# Patient Record
Sex: MALE | Race: WHITE | HISPANIC OR LATINO | URBAN - METROPOLITAN AREA
[De-identification: names, ages, dates, MRNs, and addresses within clinical notes are randomized per-mention and may not be internally consistent; named-entity substitution may affect disease eponyms.]

---

## 2018-10-03 ENCOUNTER — HOSPITAL ENCOUNTER (INPATIENT)
Facility: MEDICAL CENTER | Age: 53
LOS: 2 days | DRG: 419 | End: 2018-10-05
Attending: EMERGENCY MEDICINE | Admitting: HOSPITALIST
Payer: COMMERCIAL

## 2018-10-03 ENCOUNTER — APPOINTMENT (OUTPATIENT)
Dept: RADIOLOGY | Facility: MEDICAL CENTER | Age: 53
DRG: 419 | End: 2018-10-03
Attending: HOSPITALIST
Payer: COMMERCIAL

## 2018-10-03 ENCOUNTER — HOSPITAL ENCOUNTER (OUTPATIENT)
Dept: RADIOLOGY | Facility: MEDICAL CENTER | Age: 53
End: 2018-10-03

## 2018-10-03 DIAGNOSIS — K80.50 CHOLEDOCHOLITHIASIS: ICD-10-CM

## 2018-10-03 PROBLEM — K80.20 CHOLELITHIASIS: Status: ACTIVE | Noted: 2018-10-03

## 2018-10-03 PROBLEM — E66.3 OVERWEIGHT: Status: ACTIVE | Noted: 2018-10-03

## 2018-10-03 LAB
ALBUMIN SERPL BCP-MCNC: 3.8 G/DL (ref 3.2–4.9)
ALBUMIN/GLOB SERPL: 1.4 G/DL
ALP SERPL-CCNC: 76 U/L (ref 30–99)
ALT SERPL-CCNC: 214 U/L (ref 2–50)
ANION GAP SERPL CALC-SCNC: 6 MMOL/L (ref 0–11.9)
AST SERPL-CCNC: 263 U/L (ref 12–45)
BASOPHILS # BLD AUTO: 0.3 % (ref 0–1.8)
BASOPHILS # BLD: 0.02 K/UL (ref 0–0.12)
BILIRUB CONJ SERPL-MCNC: 1.7 MG/DL (ref 0.1–0.5)
BILIRUB SERPL-MCNC: 2.9 MG/DL (ref 0.1–1.5)
BUN SERPL-MCNC: 7 MG/DL (ref 8–22)
CALCIUM SERPL-MCNC: 8.9 MG/DL (ref 8.5–10.5)
CHLORIDE SERPL-SCNC: 106 MMOL/L (ref 96–112)
CO2 SERPL-SCNC: 25 MMOL/L (ref 20–33)
CREAT SERPL-MCNC: 0.76 MG/DL (ref 0.5–1.4)
EOSINOPHIL # BLD AUTO: 0.03 K/UL (ref 0–0.51)
EOSINOPHIL NFR BLD: 0.4 % (ref 0–6.9)
ERYTHROCYTE [DISTWIDTH] IN BLOOD BY AUTOMATED COUNT: 41.1 FL (ref 35.9–50)
EST. AVERAGE GLUCOSE BLD GHB EST-MCNC: 128 MG/DL
GLOBULIN SER CALC-MCNC: 2.8 G/DL (ref 1.9–3.5)
GLUCOSE SERPL-MCNC: 121 MG/DL (ref 65–99)
HBA1C MFR BLD: 6.1 % (ref 0–5.6)
HCT VFR BLD AUTO: 40.3 % (ref 42–52)
HGB BLD-MCNC: 14.1 G/DL (ref 14–18)
IMM GRANULOCYTES # BLD AUTO: 0.02 K/UL (ref 0–0.11)
IMM GRANULOCYTES NFR BLD AUTO: 0.3 % (ref 0–0.9)
LIPASE SERPL-CCNC: 65 U/L (ref 11–82)
LYMPHOCYTES # BLD AUTO: 0.83 K/UL (ref 1–4.8)
LYMPHOCYTES NFR BLD: 11.4 % (ref 22–41)
MCH RBC QN AUTO: 31.9 PG (ref 27–33)
MCHC RBC AUTO-ENTMCNC: 35 G/DL (ref 33.7–35.3)
MCV RBC AUTO: 91.2 FL (ref 81.4–97.8)
MONOCYTES # BLD AUTO: 0.55 K/UL (ref 0–0.85)
MONOCYTES NFR BLD AUTO: 7.5 % (ref 0–13.4)
NEUTROPHILS # BLD AUTO: 5.86 K/UL (ref 1.82–7.42)
NEUTROPHILS NFR BLD: 80.1 % (ref 44–72)
NRBC # BLD AUTO: 0 K/UL
NRBC BLD-RTO: 0 /100 WBC
PLATELET # BLD AUTO: 166 K/UL (ref 164–446)
PMV BLD AUTO: 11.1 FL (ref 9–12.9)
POTASSIUM SERPL-SCNC: 3.7 MMOL/L (ref 3.6–5.5)
PROT SERPL-MCNC: 6.6 G/DL (ref 6–8.2)
RBC # BLD AUTO: 4.42 M/UL (ref 4.7–6.1)
SODIUM SERPL-SCNC: 137 MMOL/L (ref 135–145)
TROPONIN I SERPL-MCNC: <0.01 NG/ML (ref 0–0.04)
WBC # BLD AUTO: 7.3 K/UL (ref 4.8–10.8)

## 2018-10-03 PROCEDURE — 700101 HCHG RX REV CODE 250: Performed by: INTERNAL MEDICINE

## 2018-10-03 PROCEDURE — 502571 HCHG PACK, LAP CHOLE: Performed by: SURGERY

## 2018-10-03 PROCEDURE — 700102 HCHG RX REV CODE 250 W/ 637 OVERRIDE(OP): Performed by: HOSPITALIST

## 2018-10-03 PROCEDURE — A6402 STERILE GAUZE <= 16 SQ IN: HCPCS | Performed by: SURGERY

## 2018-10-03 PROCEDURE — A9270 NON-COVERED ITEM OR SERVICE: HCPCS | Performed by: HOSPITALIST

## 2018-10-03 PROCEDURE — 93005 ELECTROCARDIOGRAM TRACING: CPT | Performed by: EMERGENCY MEDICINE

## 2018-10-03 PROCEDURE — 99223 1ST HOSP IP/OBS HIGH 75: CPT | Performed by: HOSPITALIST

## 2018-10-03 PROCEDURE — 770006 HCHG ROOM/CARE - MED/SURG/GYN SEMI*

## 2018-10-03 PROCEDURE — 36415 COLL VENOUS BLD VENIPUNCTURE: CPT

## 2018-10-03 PROCEDURE — 700111 HCHG RX REV CODE 636 W/ 250 OVERRIDE (IP): Performed by: INTERNAL MEDICINE

## 2018-10-03 PROCEDURE — 82248 BILIRUBIN DIRECT: CPT

## 2018-10-03 PROCEDURE — 501838 HCHG SUTURE GENERAL: Performed by: SURGERY

## 2018-10-03 PROCEDURE — 160002 HCHG RECOVERY MINUTES (STAT): Performed by: SURGERY

## 2018-10-03 PROCEDURE — 160039 HCHG SURGERY MINUTES - EA ADDL 1 MIN LEVEL 3: Performed by: SURGERY

## 2018-10-03 PROCEDURE — 700105 HCHG RX REV CODE 258: Performed by: INTERNAL MEDICINE

## 2018-10-03 PROCEDURE — 88304 TISSUE EXAM BY PATHOLOGIST: CPT

## 2018-10-03 PROCEDURE — 501584 HCHG TROCAR, THRD CAN&SEAL11X100: Performed by: SURGERY

## 2018-10-03 PROCEDURE — 83036 HEMOGLOBIN GLYCOSYLATED A1C: CPT

## 2018-10-03 PROCEDURE — 501583 HCHG TROCAR, THRD CAN&SEAL 5X100: Performed by: SURGERY

## 2018-10-03 PROCEDURE — 84484 ASSAY OF TROPONIN QUANT: CPT

## 2018-10-03 PROCEDURE — 110454 HCHG SHELL REV 250: Performed by: SURGERY

## 2018-10-03 PROCEDURE — 160028 HCHG SURGERY MINUTES - 1ST 30 MINS LEVEL 3: Performed by: SURGERY

## 2018-10-03 PROCEDURE — 160036 HCHG PACU - EA ADDL 30 MINS PHASE I: Performed by: SURGERY

## 2018-10-03 PROCEDURE — 83690 ASSAY OF LIPASE: CPT

## 2018-10-03 PROCEDURE — 501577 HCHG TROCAR, STEP 11MM: Performed by: SURGERY

## 2018-10-03 PROCEDURE — A9270 NON-COVERED ITEM OR SERVICE: HCPCS | Performed by: SURGERY

## 2018-10-03 PROCEDURE — 501570 HCHG TROCAR, SEPARATOR: Performed by: SURGERY

## 2018-10-03 PROCEDURE — 74181 MRI ABDOMEN W/O CONTRAST: CPT

## 2018-10-03 PROCEDURE — 99285 EMERGENCY DEPT VISIT HI MDM: CPT

## 2018-10-03 PROCEDURE — 700111 HCHG RX REV CODE 636 W/ 250 OVERRIDE (IP)

## 2018-10-03 PROCEDURE — 501338 HCHG SHEARS, ENDO: Performed by: SURGERY

## 2018-10-03 PROCEDURE — 82105 ALPHA-FETOPROTEIN SERUM: CPT

## 2018-10-03 PROCEDURE — 160048 HCHG OR STATISTICAL LEVEL 1-5: Performed by: SURGERY

## 2018-10-03 PROCEDURE — 700102 HCHG RX REV CODE 250 W/ 637 OVERRIDE(OP): Performed by: ANESTHESIOLOGY

## 2018-10-03 PROCEDURE — 85025 COMPLETE CBC W/AUTO DIFF WBC: CPT

## 2018-10-03 PROCEDURE — 501399 HCHG SPECIMAN BAG, ENDO CATC: Performed by: SURGERY

## 2018-10-03 PROCEDURE — 80053 COMPREHEN METABOLIC PANEL: CPT

## 2018-10-03 PROCEDURE — 502648 HCHG APPLICATOR, EVICEL: Performed by: SURGERY

## 2018-10-03 PROCEDURE — 700101 HCHG RX REV CODE 250

## 2018-10-03 PROCEDURE — 700102 HCHG RX REV CODE 250 W/ 637 OVERRIDE(OP): Performed by: SURGERY

## 2018-10-03 PROCEDURE — 160035 HCHG PACU - 1ST 60 MINS PHASE I: Performed by: SURGERY

## 2018-10-03 PROCEDURE — 160009 HCHG ANES TIME/MIN: Performed by: SURGERY

## 2018-10-03 PROCEDURE — A9270 NON-COVERED ITEM OR SERVICE: HCPCS | Performed by: ANESTHESIOLOGY

## 2018-10-03 PROCEDURE — 0FT44ZZ RESECTION OF GALLBLADDER, PERCUTANEOUS ENDOSCOPIC APPROACH: ICD-10-PCS | Performed by: SURGERY

## 2018-10-03 PROCEDURE — 700111 HCHG RX REV CODE 636 W/ 250 OVERRIDE (IP): Performed by: ANESTHESIOLOGY

## 2018-10-03 PROCEDURE — 500868 HCHG NEEDLE, SURGI(VARES): Performed by: SURGERY

## 2018-10-03 RX ORDER — HYDROMORPHONE HYDROCHLORIDE 2 MG/ML
0.1 INJECTION, SOLUTION INTRAMUSCULAR; INTRAVENOUS; SUBCUTANEOUS
Status: DISCONTINUED | OUTPATIENT
Start: 2018-10-03 | End: 2018-10-03 | Stop reason: HOSPADM

## 2018-10-03 RX ORDER — OXYCODONE HYDROCHLORIDE 5 MG/1
5 TABLET ORAL EVERY 4 HOURS PRN
Status: DISCONTINUED | OUTPATIENT
Start: 2018-10-03 | End: 2018-10-05 | Stop reason: HOSPADM

## 2018-10-03 RX ORDER — OXYCODONE HYDROCHLORIDE 5 MG/1
5 TABLET ORAL
Status: DISCONTINUED | OUTPATIENT
Start: 2018-10-03 | End: 2018-10-03 | Stop reason: HOSPADM

## 2018-10-03 RX ORDER — HYDROMORPHONE HYDROCHLORIDE 2 MG/ML
0.2 INJECTION, SOLUTION INTRAMUSCULAR; INTRAVENOUS; SUBCUTANEOUS
Status: DISCONTINUED | OUTPATIENT
Start: 2018-10-03 | End: 2018-10-03 | Stop reason: HOSPADM

## 2018-10-03 RX ORDER — ONDANSETRON 2 MG/ML
4 INJECTION INTRAMUSCULAR; INTRAVENOUS EVERY 4 HOURS PRN
Status: DISCONTINUED | OUTPATIENT
Start: 2018-10-03 | End: 2018-10-05 | Stop reason: HOSPADM

## 2018-10-03 RX ORDER — CLONIDINE HYDROCHLORIDE 0.1 MG/1
0.1 TABLET ORAL EVERY 6 HOURS PRN
Status: DISCONTINUED | OUTPATIENT
Start: 2018-10-03 | End: 2018-10-05 | Stop reason: HOSPADM

## 2018-10-03 RX ORDER — MEPERIDINE HYDROCHLORIDE 25 MG/ML
12.5 INJECTION INTRAMUSCULAR; INTRAVENOUS; SUBCUTANEOUS
Status: DISCONTINUED | OUTPATIENT
Start: 2018-10-03 | End: 2018-10-03 | Stop reason: HOSPADM

## 2018-10-03 RX ORDER — ONDANSETRON 4 MG/1
4 TABLET, ORALLY DISINTEGRATING ORAL EVERY 4 HOURS PRN
Status: DISCONTINUED | OUTPATIENT
Start: 2018-10-03 | End: 2018-10-05 | Stop reason: HOSPADM

## 2018-10-03 RX ORDER — AMOXICILLIN 250 MG
2 CAPSULE ORAL 2 TIMES DAILY
Status: DISCONTINUED | OUTPATIENT
Start: 2018-10-03 | End: 2018-10-05 | Stop reason: HOSPADM

## 2018-10-03 RX ORDER — BISACODYL 10 MG
10 SUPPOSITORY, RECTAL RECTAL
Status: DISCONTINUED | OUTPATIENT
Start: 2018-10-03 | End: 2018-10-05 | Stop reason: HOSPADM

## 2018-10-03 RX ORDER — POLYETHYLENE GLYCOL 3350 17 G/17G
1 POWDER, FOR SOLUTION ORAL
Status: DISCONTINUED | OUTPATIENT
Start: 2018-10-03 | End: 2018-10-05 | Stop reason: HOSPADM

## 2018-10-03 RX ORDER — SODIUM CHLORIDE, SODIUM LACTATE, POTASSIUM CHLORIDE, CALCIUM CHLORIDE 600; 310; 30; 20 MG/100ML; MG/100ML; MG/100ML; MG/100ML
INJECTION, SOLUTION INTRAVENOUS
Status: COMPLETED | OUTPATIENT
Start: 2018-10-03 | End: 2018-10-03

## 2018-10-03 RX ORDER — ACETAMINOPHEN 325 MG/1
650 TABLET ORAL EVERY 6 HOURS PRN
Status: DISCONTINUED | OUTPATIENT
Start: 2018-10-03 | End: 2018-10-05 | Stop reason: HOSPADM

## 2018-10-03 RX ORDER — PROMETHAZINE HYDROCHLORIDE 25 MG/1
12.5-25 TABLET ORAL EVERY 4 HOURS PRN
Status: DISCONTINUED | OUTPATIENT
Start: 2018-10-03 | End: 2018-10-05 | Stop reason: HOSPADM

## 2018-10-03 RX ORDER — HALOPERIDOL 5 MG/ML
1 INJECTION INTRAMUSCULAR
Status: DISCONTINUED | OUTPATIENT
Start: 2018-10-03 | End: 2018-10-03 | Stop reason: HOSPADM

## 2018-10-03 RX ORDER — MAGNESIUM HYDROXIDE 1200 MG/15ML
LIQUID ORAL
Status: COMPLETED | OUTPATIENT
Start: 2018-10-03 | End: 2018-10-03

## 2018-10-03 RX ORDER — MORPHINE SULFATE 4 MG/ML
2 INJECTION, SOLUTION INTRAMUSCULAR; INTRAVENOUS EVERY 4 HOURS PRN
Status: DISCONTINUED | OUTPATIENT
Start: 2018-10-03 | End: 2018-10-05 | Stop reason: HOSPADM

## 2018-10-03 RX ORDER — PROMETHAZINE HYDROCHLORIDE 25 MG/1
12.5-25 SUPPOSITORY RECTAL EVERY 4 HOURS PRN
Status: DISCONTINUED | OUTPATIENT
Start: 2018-10-03 | End: 2018-10-05 | Stop reason: HOSPADM

## 2018-10-03 RX ORDER — OXYCODONE HYDROCHLORIDE 10 MG/1
10 TABLET ORAL EVERY 4 HOURS PRN
Status: DISCONTINUED | OUTPATIENT
Start: 2018-10-03 | End: 2018-10-05 | Stop reason: HOSPADM

## 2018-10-03 RX ORDER — BUPIVACAINE HYDROCHLORIDE AND EPINEPHRINE 2.5; 5 MG/ML; UG/ML
INJECTION, SOLUTION EPIDURAL; INFILTRATION; INTRACAUDAL; PERINEURAL
Status: DISCONTINUED | OUTPATIENT
Start: 2018-10-03 | End: 2018-10-03 | Stop reason: HOSPADM

## 2018-10-03 RX ORDER — OXYCODONE HYDROCHLORIDE 5 MG/1
10 TABLET ORAL
Status: DISCONTINUED | OUTPATIENT
Start: 2018-10-03 | End: 2018-10-03 | Stop reason: HOSPADM

## 2018-10-03 RX ORDER — SODIUM CHLORIDE 9 MG/ML
INJECTION, SOLUTION INTRAVENOUS CONTINUOUS
Status: DISCONTINUED | OUTPATIENT
Start: 2018-10-03 | End: 2018-10-04

## 2018-10-03 RX ORDER — HYDROMORPHONE HYDROCHLORIDE 2 MG/ML
0.4 INJECTION, SOLUTION INTRAMUSCULAR; INTRAVENOUS; SUBCUTANEOUS
Status: DISCONTINUED | OUTPATIENT
Start: 2018-10-03 | End: 2018-10-03 | Stop reason: HOSPADM

## 2018-10-03 RX ORDER — OXYCODONE HCL 5 MG/5 ML
10 SOLUTION, ORAL ORAL
Status: COMPLETED | OUTPATIENT
Start: 2018-10-03 | End: 2018-10-03

## 2018-10-03 RX ORDER — OXYCODONE HCL 5 MG/5 ML
5 SOLUTION, ORAL ORAL
Status: COMPLETED | OUTPATIENT
Start: 2018-10-03 | End: 2018-10-03

## 2018-10-03 RX ADMIN — FENTANYL CITRATE 50 MCG: 50 INJECTION, SOLUTION INTRAMUSCULAR; INTRAVENOUS at 17:03

## 2018-10-03 RX ADMIN — METRONIDAZOLE 500 MG: 500 INJECTION, SOLUTION INTRAVENOUS at 21:39

## 2018-10-03 RX ADMIN — ACETAMINOPHEN 650 MG: 325 TABLET, FILM COATED ORAL at 22:03

## 2018-10-03 RX ADMIN — METRONIDAZOLE 500 MG: 500 INJECTION, SOLUTION INTRAVENOUS at 13:44

## 2018-10-03 RX ADMIN — CEFTRIAXONE SODIUM 2 G: 2 INJECTION, POWDER, FOR SOLUTION INTRAMUSCULAR; INTRAVENOUS at 10:33

## 2018-10-03 RX ADMIN — METRONIDAZOLE 500 MG: 500 INJECTION, SOLUTION INTRAVENOUS at 09:04

## 2018-10-03 RX ADMIN — OXYCODONE HYDROCHLORIDE 10 MG: 5 SOLUTION ORAL at 17:02

## 2018-10-03 ASSESSMENT — COGNITIVE AND FUNCTIONAL STATUS - GENERAL
SUGGESTED CMS G CODE MODIFIER MOBILITY: CH
SUGGESTED CMS G CODE MODIFIER DAILY ACTIVITY: CH
DAILY ACTIVITIY SCORE: 24
SUGGESTED CMS G CODE MODIFIER DAILY ACTIVITY: CH
DAILY ACTIVITIY SCORE: 24
MOBILITY SCORE: 24

## 2018-10-03 ASSESSMENT — PAIN SCALES - GENERAL
PAINLEVEL_OUTOF10: 2
PAINLEVEL_OUTOF10: 4
PAINLEVEL_OUTOF10: 3
PAINLEVEL_OUTOF10: 0
PAINLEVEL_OUTOF10: 4
PAINLEVEL_OUTOF10: 5

## 2018-10-03 ASSESSMENT — PATIENT HEALTH QUESTIONNAIRE - PHQ9
SUM OF ALL RESPONSES TO PHQ9 QUESTIONS 1 AND 2: 0
1. LITTLE INTEREST OR PLEASURE IN DOING THINGS: NOT AT ALL
2. FEELING DOWN, DEPRESSED, IRRITABLE, OR HOPELESS: NOT AT ALL

## 2018-10-03 ASSESSMENT — ENCOUNTER SYMPTOMS
NEUROLOGICAL NEGATIVE: 1
ABDOMINAL PAIN: 1
MUSCULOSKELETAL NEGATIVE: 1
RESPIRATORY NEGATIVE: 1
CONSTITUTIONAL NEGATIVE: 1
PSYCHIATRIC NEGATIVE: 1
CARDIOVASCULAR NEGATIVE: 1
EYES NEGATIVE: 1
NAUSEA: 1

## 2018-10-03 ASSESSMENT — LIFESTYLE VARIABLES: ALCOHOL_USE: NO

## 2018-10-03 NOTE — ED PROVIDER NOTES
"ED Provider Note    Scribed for Maryann Napier M.D. by Gosia Villatoro. 10/3/2018  2:00 AM    Means of arrival: Ambulance  History obtained from: Patient  History limited by: None      CHIEF COMPLAINT  Chief Complaint   Patient presents with   • Epigastric Pain     Pt reporting constant epigastric pain that started yesterday approx noon.   • Nausea       HPI  Francis Chavarria is a 53 y.o. male with history of known gallstones who presents to the Emergency Department as a transfer from [facility] for evaluation of epigastric pain onset at 12 PM today. The patient endorses associated nausea, but denies vomiting.  Patient states his pain is controlled at this time.  He has no current complaints.  He was initially evaluated at [facility] today and found to have choledocolithiasis. The patient received 15 mg Toradol and 4 mg of Morphine at that time, which has now resolved his pain. The patient was transferred to [facility] for a GI consult and possible ERCP. He denies any recent dysuria, hematuria, or diarrhea. The patient reports no prior abdominal surgeries.     REVIEW OF SYSTEMS  Pertinent positive include epigastric pain and nausea. Pertinent negative include vomiting, dysuria, hematuria, or diarrhea. All other systems reviewed and are negative.      PAST MEDICAL HISTORY   Gallstones    SOCIAL HISTORY  Social History     Social History Main Topics   • Smoking status: None noted   • Smokeless tobacco: No   • Alcohol use None noted   • Drug use: None noted   • Sexual activity: None noted       SURGICAL HISTORY  No abdominal surgeries    CURRENT MEDICATIONS  Home Medications     Reviewed by Lakisha Frye R.N. (Registered Nurse) on 10/03/18 at 0213  Med List Status: Complete   Medication Last Dose Status        Patient Yakov Taking any Medications                       ALLERGIES  No Known Allergies    PHYSICAL EXAM   VITAL SIGNS: Ht 1.702 m (5' 7\")   Wt 83.8 kg (184 lb 11.9 oz)   BMI 28.94 kg/m²  "   Constitutional: Non-toxic appearing  male. Alert in no apparent distress.  HENT: Normocephalic, Atraumatic. Bilateral external ears normal. Nose normal.  Moist mucous membranes.  Oropharynx clear.  Eyes: Pupils are equal and reactive. Conjunctiva normal.   Neck: Supple, full range of motion  Heart: Regular rate and rhythm. Systolic murmur noted.    Lungs: No respiratory distress, normal work of breathing. Lungs clear to auscultation bilaterally.  Abdomen Soft, no distention. Mild epigastric tenderness without rebound or guarding  Musculoskeletal: Atraumatic. No obvious deformities noted.  No lower extremity edema.  Skin: Warm, Dry.  No erythema, No rash.   Neurologic: Alert and oriented x3. Moving all extremities spontaneously without focal deficits.  Psychiatric: Affect normal, Mood normal, Appears appropriate and not intoxicated.      DIAGNOSTIC STUDIES    EKG  EKG personally reviewed by myself in the absence of a cardiologist showed:  Sinus Bradycardia with rate of 57  Normal axis and intervals  No ectopy or hypertrophy  No ST or T wave change  Impression: Normal EKG    LABS  Personally reviewed by me  Labs Reviewed   CBC WITH DIFFERENTIAL - Abnormal; Notable for the following:        Result Value    RBC 4.42 (*)     Hematocrit 40.3 (*)     Neutrophils-Polys 80.10 (*)     Lymphocytes 11.40 (*)     Lymphs (Absolute) 0.83 (*)     All other components within normal limits   COMP METABOLIC PANEL - Abnormal; Notable for the following:     Glucose 121 (*)     Bun 7 (*)     AST(SGOT) 263 (*)     ALT(SGPT) 214 (*)     Total Bilirubin 2.9 (*)     All other components within normal limits   LIPASE   TROPONIN   ESTIMATED GFR   HEMOGLOBIN A1C         RADIOLOGY  Personally reviewed by me  OUTSIDE IMAGES-US ABDOMEN   Final Result      [id number]    (Results Pending)         ED COURSE  Vitals:    10/03/18 0230 10/03/18 0300 10/03/18 0330 10/03/18 0400   BP:       Pulse: (!) 56 71 (!) 59 66   Resp: 16 17 14 16    Temp:       SpO2: 98% 94% 95% 95%   Weight:       Height:             Medications administered:      Old records personally reviewed:  Obtained and reviewed past medical records from [facility] which indicate patient had WBC of 10.3, Bilirubin of 2.7, AST of 499, and ALT of 309. US showed cholelithiasis, mild gallbladder wall thickening, and 6 mm CBD.     2:00 AM Patient seen and examined at bedside. The patient presents with epigastric pain. Ordered for Troponin, Lipase, CMP, CBC with differential, and EKG to evaluate.     MEDICAL DECISION MAKING  Patient with known history of cholelithiasis who presents as a transfer from outside hospital with concern for choledocholithiasis.  Patient is afebrile with normal vitals on arrival and well-appearing.  His pain is currently controlled.  EKG without evidence of ischemia or arrhythmia, troponin negative, doubt ACS.  Abdominal exam is not concerning for bowel obstruction, perforation, appendicitis, diverticulitis.  Patient does have elevated bilirubin in addition to transaminitis consistent with likely obstructive pattern.  I reviewed his outside imaging demonstrating obvious stones and mild wall thickening with associated mild dilation of the common bile duct.  There are no overt signs of acute holecystitis and patient has no associated leukocytosis.  We will plan for admission for MRCP in the morning followed by likely gastroenterology and surgical consults.    3:31 AM - Consut with [facility], who agrees to admit the patient. He will have MRCP in the morning and GI consultation.  Patient is stable at this time for transfer to the floor.      DISPOSITION:  Patient will be admitted to [facility], in guarded condition.    IMPRESSION  (K80.50) Choledocholithiasis    Results, diagnoses, and treatment options were discussed with the patient and/or family. Patient verbalized understanding of plan of care.    New Prescriptions    No medications on  file            Gosia COKER (Kenia), am scribing for, and in the presence of, Maryann Napier M.D..    Electronically signed by: Gosia Villatoro (Kenia), 10/3/2018    Maryann COKER M.D. personally performed the services described in this documentation, as scribed by Gosia Villatoro in my presence, and it is both accurate and complete.    C.    The note accurately reflects work and decisions made by me.  Maryann Napier  10/3/2018  7:57 AM

## 2018-10-03 NOTE — ED TRIAGE NOTES
"Chief Complaint   Patient presents with   • Epigastric Pain     Pt reporting constant epigastric pain that started yesterday approx noon.   • Nausea     /82   Pulse (!) 53   Temp 36.6 °C (97.9 °F)   Resp 16   Ht 1.702 m (5' 7\")   Wt 83.8 kg (184 lb 11.9 oz)   SpO2 96%   BMI 28.94 kg/m²     Pt brought in by [facility], transfer from [facility], pt denies pain currently. AOx4. Placed in room RD 1. Complaints and vitals as above. Pt on monitors, all alarms audible. Chart flagged for ERP to see.  "

## 2018-10-03 NOTE — PROGRESS NOTES
Patient is examed and agreed with H&P assessment and plan.    Possible cholecystitis, start iv abx. Pending MRCP, possible surgery vs GI consult.

## 2018-10-03 NOTE — H&P
Hospital Medicine History & Physical Note    Date of Service  10/3/2018    Primary Care Physician  No primary care provider on file.    Consultants  None    Code Status  Full cod e    Chief Complaint  Abdominal  Pain     History of Presenting Illness  53 y.o. Male with known history of cholelithiasis, with prior episodes of symptomatic cholelithiasis, was in his usual state of health until the day prior to admission.  He reports that approximately 6 hours prior to his arrival at an outside facility, he developed abdominal pain.  This was in his epigastrium, seemed to radiate to his back and up his neck and back.  He noted no exacerbating or alleviating factors.  He apparently ate a normal meal prior to the pain.  He also attempted to eat to alleviate the pain which has worked in the past.  When the pain did not go away, he presented to an outside for further evaluation.  He was subsequently transferred to this facility for higher level of care.  He currently has no nausea or vomiting.  His abdominal pain has subsided with treatment.    Review of Systems  Review of Systems   Constitutional: Negative.    HENT: Negative.    Eyes: Negative.    Respiratory: Negative.    Cardiovascular: Negative.    Gastrointestinal: Positive for abdominal pain and nausea.   Genitourinary: Negative.    Musculoskeletal: Negative.    Skin: Negative.    Neurological: Negative.    Endo/Heme/Allergies: Negative.    Psychiatric/Behavioral: Negative.        Past Medical History  No reported past medical history    Surgical History   has a past surgical history that includes other orthopedic surgery.     Family History  family history includes No Known Problems in his father and mother.     Social History   reports that he has never smoked. He has never used smokeless tobacco. He reports that he does not drink alcohol or use drugs.    Allergies  No Known Allergies    Medications  None       Physical Exam  Temp:  [36.6 °C (97.9 °F)] 36.6 °C (97.9  °F)  Pulse:  [53-71] 71  Resp:  [16-17] 17  BP: (137)/(82) 137/82    Physical Exam   Constitutional: He is oriented to person, place, and time. He appears well-developed and well-nourished. No distress.   HENT:   Head: Normocephalic and atraumatic.   Eyes: Pupils are equal, round, and reactive to light. Conjunctivae are normal.   Neck: Normal range of motion. Neck supple. No tracheal deviation present. No thyromegaly present.   Cardiovascular: Normal rate, regular rhythm and normal heart sounds.  Exam reveals no gallop and no friction rub.    No murmur heard.  Pulmonary/Chest: Effort normal and breath sounds normal. No respiratory distress. He has no wheezes.   Abdominal: Soft. Bowel sounds are normal. He exhibits no distension and no mass. There is no tenderness. There is no rebound and no guarding.   Musculoskeletal: Normal range of motion. He exhibits no edema.   Lymphadenopathy:     He has no cervical adenopathy.   Neurological: He is alert and oriented to person, place, and time. No cranial nerve deficit.   Skin: Skin is warm and dry. He is not diaphoretic.   Psychiatric: He has a normal mood and affect.   Nursing note and vitals reviewed.      Laboratory:  Recent Labs      10/03/18   0202   WBC  7.3   RBC  4.42*   HEMOGLOBIN  14.1   HEMATOCRIT  40.3*   MCV  91.2   MCH  31.9   MCHC  35.0   RDW  41.1   PLATELETCT  166   MPV  11.1     Recent Labs      10/03/18   0202   SODIUM  137   POTASSIUM  3.7   CHLORIDE  106   CO2  25   GLUCOSE  121*   BUN  7*   CREATININE  0.76   CALCIUM  8.9     Recent Labs      10/03/18   0202   ALTSGPT  214*   ASTSGOT  263*   ALKPHOSPHAT  76   TBILIRUBIN  2.9*   LIPASE  65   GLUCOSE  121*                 Recent Labs      10/03/18   0202   TROPONINI  <0.01       Urinalysis:    No results found     Imaging:  OUTSIDE IMAGES-US ABDOMEN   Final Result      Abdominal ultrasound from outside facility shows cholelithiasis, mild gallbladder wall thickening, possible cholecystitis in the  appropriate clinical setting.  Common bile duct is measured at 6 mm      Assessment/Plan:  I anticipate this patient will require at least two midnights for appropriate medical management, necessitating inpatient admission.    * Choledocholithiasis   Assessment & Plan    With common bile duct dilatation, elevated aminotransferase levels, concern for obstruction.  Will obtain magnetic resonance cholangiopancreatography.        Cholelithiasis   Assessment & Plan    Known disease, with symptoms.  Apparently there was plans for outpatient surgery, this will need to still go forward.        Overweight   Assessment & Plan    Body mass index is 28.94 kg/m².              VTE prophylaxis: SCD

## 2018-10-03 NOTE — PROGRESS NOTES
General Surgery Note  Consulted by Dr. Briggs for possible cholecystitis with question of choledocholithiasis.   Patient currently in MRI.   I will await results and return later to see the patient.     Update 1430: MRI shows no clear evidence of choledocholithiasis at this time though he has a significant dilation of the common duct. No evidence of acute cholecystitis, though there is remaining gallstones.   Using a  I recommended he have a cholecystectomy this admission, he is agreeable. We discussed R/B/A as dictated. All questions answered. Will proceed this afternoon.   Dictation: [id number]

## 2018-10-03 NOTE — ASSESSMENT & PLAN NOTE
Known disease, with symptoms.  Apparently there was plans for outpatient surgery, this will need to still go forward.

## 2018-10-03 NOTE — ASSESSMENT & PLAN NOTE
With common bile duct dilatation, elevated aminotransferase levels, concern for obstruction.  Will obtain magnetic resonance cholangiopancreatography.

## 2018-10-03 NOTE — ED NOTES
KINDER FALL RISK      RISK  Present to ED b/c of fall (syncope, seizure, or ALOC) No  Age  >70 No  Altered Mental Status (Intoxicated with alcohol or substance confusion, inability to follow instructions, disorientation) No  Impaired Mobility (Ambulates or transfers with assistive devices or assist. Ambulates with unsteady gait and no assistance.  Unable to ambulate to transfer.) No  Nursing Judgement (Bowel or bladder incontinence, diarrhea, urinary frequency or urgency, leg weakness, orthostatic hypotension, dizziness or vertigo, narcotic use.) No

## 2018-10-03 NOTE — CARE PLAN
Problem: Knowledge Deficit  Goal: Knowledge of disease process/condition, treatment plan, diagnostic tests, and medications will improve    Intervention: Assess knowledge level of disease process/condition, treatment plan, diagnostic tests, and medications  Went over POC MRI today.

## 2018-10-04 PROBLEM — R74.01 TRANSAMINITIS: Status: ACTIVE | Noted: 2018-10-03

## 2018-10-04 PROBLEM — R16.0 LIVER MASS: Status: ACTIVE | Noted: 2018-10-04

## 2018-10-04 LAB
ALBUMIN SERPL BCP-MCNC: 3.8 G/DL (ref 3.2–4.9)
ALBUMIN/GLOB SERPL: 1.4 G/DL
ALP SERPL-CCNC: 100 U/L (ref 30–99)
ALT SERPL-CCNC: 180 U/L (ref 2–50)
ANION GAP SERPL CALC-SCNC: 8 MMOL/L (ref 0–11.9)
AST SERPL-CCNC: 121 U/L (ref 12–45)
BASOPHILS # BLD AUTO: 0.2 % (ref 0–1.8)
BASOPHILS # BLD: 0.01 K/UL (ref 0–0.12)
BILIRUB SERPL-MCNC: 2.4 MG/DL (ref 0.1–1.5)
BUN SERPL-MCNC: 9 MG/DL (ref 8–22)
CALCIUM SERPL-MCNC: 8.6 MG/DL (ref 8.5–10.5)
CHLORIDE SERPL-SCNC: 102 MMOL/L (ref 96–112)
CO2 SERPL-SCNC: 26 MMOL/L (ref 20–33)
CREAT SERPL-MCNC: 1.04 MG/DL (ref 0.5–1.4)
EKG IMPRESSION: NORMAL
EOSINOPHIL # BLD AUTO: 0 K/UL (ref 0–0.51)
EOSINOPHIL NFR BLD: 0 % (ref 0–6.9)
ERYTHROCYTE [DISTWIDTH] IN BLOOD BY AUTOMATED COUNT: 41.5 FL (ref 35.9–50)
GLOBULIN SER CALC-MCNC: 2.8 G/DL (ref 1.9–3.5)
GLUCOSE SERPL-MCNC: 147 MG/DL (ref 65–99)
HCT VFR BLD AUTO: 40.3 % (ref 42–52)
HGB BLD-MCNC: 14.2 G/DL (ref 14–18)
IMM GRANULOCYTES # BLD AUTO: 0.02 K/UL (ref 0–0.11)
IMM GRANULOCYTES NFR BLD AUTO: 0.4 % (ref 0–0.9)
LYMPHOCYTES # BLD AUTO: 0.39 K/UL (ref 1–4.8)
LYMPHOCYTES NFR BLD: 6.9 % (ref 22–41)
MCH RBC QN AUTO: 32.2 PG (ref 27–33)
MCHC RBC AUTO-ENTMCNC: 35.2 G/DL (ref 33.7–35.3)
MCV RBC AUTO: 91.4 FL (ref 81.4–97.8)
MONOCYTES # BLD AUTO: 0.49 K/UL (ref 0–0.85)
MONOCYTES NFR BLD AUTO: 8.7 % (ref 0–13.4)
NEUTROPHILS # BLD AUTO: 4.72 K/UL (ref 1.82–7.42)
NEUTROPHILS NFR BLD: 83.8 % (ref 44–72)
NRBC # BLD AUTO: 0 K/UL
NRBC BLD-RTO: 0 /100 WBC
PLATELET # BLD AUTO: 162 K/UL (ref 164–446)
PMV BLD AUTO: 11.3 FL (ref 9–12.9)
POTASSIUM SERPL-SCNC: 4 MMOL/L (ref 3.6–5.5)
PROT SERPL-MCNC: 6.6 G/DL (ref 6–8.2)
RBC # BLD AUTO: 4.41 M/UL (ref 4.7–6.1)
SODIUM SERPL-SCNC: 136 MMOL/L (ref 135–145)
WBC # BLD AUTO: 5.6 K/UL (ref 4.8–10.8)

## 2018-10-04 PROCEDURE — 700102 HCHG RX REV CODE 250 W/ 637 OVERRIDE(OP): Performed by: HOSPITALIST

## 2018-10-04 PROCEDURE — 700101 HCHG RX REV CODE 250: Performed by: INTERNAL MEDICINE

## 2018-10-04 PROCEDURE — 99233 SBSQ HOSP IP/OBS HIGH 50: CPT | Performed by: INTERNAL MEDICINE

## 2018-10-04 PROCEDURE — 700111 HCHG RX REV CODE 636 W/ 250 OVERRIDE (IP): Performed by: INTERNAL MEDICINE

## 2018-10-04 PROCEDURE — A9270 NON-COVERED ITEM OR SERVICE: HCPCS | Performed by: HOSPITALIST

## 2018-10-04 PROCEDURE — 85025 COMPLETE CBC W/AUTO DIFF WBC: CPT

## 2018-10-04 PROCEDURE — 700105 HCHG RX REV CODE 258: Performed by: INTERNAL MEDICINE

## 2018-10-04 PROCEDURE — 80053 COMPREHEN METABOLIC PANEL: CPT

## 2018-10-04 PROCEDURE — 36415 COLL VENOUS BLD VENIPUNCTURE: CPT

## 2018-10-04 PROCEDURE — 770006 HCHG ROOM/CARE - MED/SURG/GYN SEMI*

## 2018-10-04 RX ADMIN — SENNOSIDES AND DOCUSATE SODIUM 2 TABLET: 8.6; 5 TABLET ORAL at 05:32

## 2018-10-04 RX ADMIN — POLYETHYLENE GLYCOL 3350 1 PACKET: 17 POWDER, FOR SOLUTION ORAL at 19:16

## 2018-10-04 RX ADMIN — SENNOSIDES AND DOCUSATE SODIUM 2 TABLET: 8.6; 5 TABLET ORAL at 17:28

## 2018-10-04 RX ADMIN — METRONIDAZOLE 500 MG: 500 INJECTION, SOLUTION INTRAVENOUS at 05:32

## 2018-10-04 RX ADMIN — CEFTRIAXONE SODIUM 2 G: 2 INJECTION, POWDER, FOR SOLUTION INTRAMUSCULAR; INTRAVENOUS at 06:37

## 2018-10-04 RX ADMIN — SODIUM CHLORIDE: 9 INJECTION, SOLUTION INTRAVENOUS at 05:37

## 2018-10-04 RX ADMIN — ACETAMINOPHEN 650 MG: 325 TABLET, FILM COATED ORAL at 05:32

## 2018-10-04 ASSESSMENT — ENCOUNTER SYMPTOMS
SHORTNESS OF BREATH: 0
PND: 0
BLURRED VISION: 0
SPUTUM PRODUCTION: 0
CHILLS: 0
WEAKNESS: 0
EYE PAIN: 0
NAUSEA: 0
PALPITATIONS: 0
FALLS: 0
TREMORS: 0
ABDOMINAL PAIN: 1
SEIZURES: 0
DEPRESSION: 0
CONSTIPATION: 0
BACK PAIN: 0
DIZZINESS: 0
NECK PAIN: 0
HEARTBURN: 0
WHEEZING: 0
VOMITING: 0
HEADACHES: 0
WEIGHT LOSS: 0
COUGH: 0
FEVER: 0
DIARRHEA: 0
SPEECH CHANGE: 0

## 2018-10-04 ASSESSMENT — PAIN SCALES - GENERAL
PAINLEVEL_OUTOF10: 0
PAINLEVEL_OUTOF10: 3
PAINLEVEL_OUTOF10: 0
PAINLEVEL_OUTOF10: 0

## 2018-10-04 ASSESSMENT — LIFESTYLE VARIABLES: SUBSTANCE_ABUSE: 0

## 2018-10-04 NOTE — PROGRESS NOTES
Progress Note               Author: Allison Persaudgunjanderic Date & Time created: 10/4/2018  12:02 PM     Interval History:  Feeling well. Pain well controlled. No N/V. Feeling tired of the clear liquids, would like to eat something else. MRI liver protocol pending.     Review of Systems:  Review of Systems   Constitutional: Negative for chills and fever.   Gastrointestinal: Positive for abdominal pain. Negative for nausea and vomiting.       Physical Exam:  Physical Exam   Constitutional: He is oriented to person, place, and time. He appears well-developed and well-nourished. No distress.   HENT:   Head: Normocephalic and atraumatic.   Eyes: Conjunctivae are normal.   Neck: Normal range of motion.   Pulmonary/Chest: Effort normal.   Abdominal: Soft. He exhibits no distension. There is tenderness.   Mild appropriate incisional tenderness. Dressings c/d/i.    Neurological: He is alert and oriented to person, place, and time.   Skin: Skin is warm and dry. He is not diaphoretic.   Psychiatric: He has a normal mood and affect. His behavior is normal.       Labs:        Invalid input(s): [id number]  Recent Labs      10/03/18   0202   TROPONINI  <0.01     Recent Labs      10/03/18   0202  10/04/18   0437   SODIUM  137  136   POTASSIUM  3.7  4.0   CHLORIDE  106  102   CO2  25  26   BUN  7*  9   CREATININE  0.76  1.04   CALCIUM  8.9  8.6     Recent Labs      10/03/18   0202  10/04/18   0437   ALTSGPT  214*  180*   ASTSGOT  263*  121*   ALKPHOSPHAT  76  100*   TBILIRUBIN  2.9*  2.4*   DBILIRUBIN  1.7*   --    LIPASE  65   --    GLUCOSE  121*  147*     Recent Labs      10/03/18   0202  10/04/18   0437   RBC  4.42*  4.41*   HEMOGLOBIN  14.1  14.2   HEMATOCRIT  40.3*  40.3*   PLATELETCT  166  162*     Recent Labs      10/03/18   0202  10/04/18   0437   WBC  7.3  5.6   NEUTSPOLYS  80.10*  83.80*   LYMPHOCYTES  11.40*  6.90*   MONOCYTES  7.50  8.70   EOSINOPHILS  0.40  0.00   BASOPHILS  0.30  0.20   ASTSGOT  263*  121*   ALTSGPT   214*  180*   ALKPHOSPHAT  76  100*   TBILIRUBIN  2.9*  2.4*     Hemodynamics:  Temp (24hrs), Av.7 °C (98.1 °F), Min:36 °C (96.8 °F), Max:37.5 °C (99.5 °F)  Temperature: 36.2 °C (97.2 °F)  Pulse  Av  Min: 53  Max: 100Heart Rate (Monitored): 89  Blood Pressure: 115/62, NIBP: 123/63     Respiratory:    Respiration: 20, Pulse Oximetry: 95 %        RUL Breath Sounds: Clear, RML Breath Sounds: Clear, RLL Breath Sounds: Diminished, PEDRO Breath Sounds: Clear, LLL Breath Sounds: Diminished  Fluids:    Intake/Output Summary (Last 24 hours) at 10/04/18 1202  Last data filed at 10/04/18 0700   Gross per 24 hour   Intake          3728.67 ml   Output              980 ml   Net          2748.67 ml     Weight: 81.1 kg (178 lb 12.7 oz)  GI/Nutrition:  Orders Placed This Encounter   Procedures   • Diet Order Low Fiber(GI Soft)     Standing Status:   Standing     Number of Occurrences:   1     Order Specific Question:   Diet:     Answer:   Low Fiber(GI Soft) [2]     Order Specific Question:   Macronutrient modifications:     Answer:   Low Fat [5]     Medical Decision Making, by Problem:  Active Hospital Problems    Diagnosis   • *Choledocholithiasis [K80.50]   • Overweight [E66.3]   • Cholelithiasis [K80.20]       Plan:  POD 1 s/p lap cholecystectomy, doing well. LFTs coming down to normal.   Advance diet as tolerated.   Discussed post operative care instructions - ok to shower, no baths/swimming x 2 weeks.   No lifting more than 20 pounds for 4 weeks.   F/u with me in 2 weeks.   Will sign off, please call with questions or concerns.     Quality-Core Measures   Reviewed items::  Labs reviewed  Almonte catheter::  No Almonte

## 2018-10-04 NOTE — PROGRESS NOTES
Distinct murmur noted upon initial assessment, verified by second RN Renée. Patient denies any history of murmur. Patient denies shortness of breath or chest pain, vitals stable, 98.3F, HR 60-70s, /68, RR 17, and 98% on 2 liters per nasal cannula. Patient ambulated a total of 400 feet post-operatively, patient tolerated well.     Notified Dr. Jessica Tohmas, received no new orders, instructed to communicate to day shift RN to bring up in rounds for MD to assess and order an echo if applicable.

## 2018-10-04 NOTE — PROGRESS NOTES
[facility] Hospitalist Progress Note    Date of Service: 10/4/2018    Chief Complaint  53 y.o. male admitted 10/3/2018 with abdominal pain and concern of choledocholithiasis.  Patient's MRCP did not show significant signs of CBD stone.  Patient was seen by surgeon and lap rosa maria was performed yesterday.  Patient tolerated procedure very well.  Incidentally patient was noticed to have liver mass, pending MRI to further characterize liver mass.    Interval Problem Update  10/4 patient still complains of abdominal pain however patient is able to passing gas and bowel function recovered.  Patient is English-speaking only person and the interview was performed with help with . Patient's pain is local, 4-6/10, intermittent and does not radiate to other location, sharp and with some tingling. Can be controlled by pain meds. Dressing in place.  Incidentally patient's MRI showed liver mass, will pursue MRI liver protocol to further characterize liver mass.    Consultants/Specialty  surgery    Disposition  Home when stable        Review of Systems   Constitutional: Negative for chills, fever and weight loss.   HENT: Negative for congestion, ear discharge, ear pain, hearing loss and nosebleeds.    Eyes: Negative for blurred vision and pain.   Respiratory: Negative for cough, sputum production, shortness of breath and wheezing.    Cardiovascular: Negative for chest pain, palpitations, leg swelling and PND.   Gastrointestinal: Negative for constipation, diarrhea, heartburn, nausea and vomiting.   Genitourinary: Negative for dysuria, frequency and hematuria.   Musculoskeletal: Negative for back pain, falls, joint pain and neck pain.   Skin: Negative for rash.   Neurological: Negative for dizziness, tremors, speech change, seizures, weakness and headaches.   Psychiatric/Behavioral: Negative for depression, substance abuse and suicidal ideas.      Physical Exam  Laboratory/Imaging   Hemodynamics  Temp (24hrs), Av.8 °C  (98.2 °F), Min:36 °C (96.8 °F), Max:37.5 °C (99.5 °F)   Temperature: 36.6 °C (97.9 °F) (wrong pt)  Pulse  Av.8  Min: 53  Max: 100 Heart Rate (Monitored): 89  Blood Pressure: 111/55 (wrong pt), NIBP: 123/63      Respiratory      Respiration: 17, Pulse Oximetry: 95 %        RUL Breath Sounds: Clear, RML Breath Sounds: Clear;Diminished, RLL Breath Sounds: Clear;Diminished, PEDRO Breath Sounds: Clear, LLL Breath Sounds: Clear    Fluids    Intake/Output Summary (Last 24 hours) at 10/04/18 0832  Last data filed at 10/04/18 0700   Gross per 24 hour   Intake          4074.82 ml   Output              980 ml   Net          3094.82 ml       Nutrition  Orders Placed This Encounter   Procedures   • Diet Order Clear Liquid     Standing Status:   Standing     Number of Occurrences:   1     Order Specific Question:   Diet:     Answer:   Clear Liquid [10]     Physical Exam   Constitutional: He is oriented to person, place, and time. He appears well-developed and well-nourished.   HENT:   Head: Normocephalic.   Eyes: Pupils are equal, round, and reactive to light. EOM are normal.   Neck: Normal range of motion. Neck supple. No JVD present. No thyromegaly present.   Cardiovascular: Normal rate, regular rhythm and normal heart sounds.  Exam reveals no gallop and no friction rub.    No murmur heard.  Pulmonary/Chest: Effort normal and breath sounds normal. No respiratory distress. He has no wheezes. He has no rales. He exhibits no tenderness.   Abdominal: Soft. Bowel sounds are normal. He exhibits no distension and no mass. There is tenderness. There is no rebound and no guarding.   Musculoskeletal: Normal range of motion. He exhibits no edema.   Lymphadenopathy:     He has no cervical adenopathy.   Neurological: He is alert and oriented to person, place, and time. He displays normal reflexes. No cranial nerve deficit.   Skin: Skin is dry. No rash noted.   Psychiatric: He has a normal mood and affect.   Nursing note and vitals  reviewed.      Recent Labs      10/03/18   0202  10/04/18   0437   WBC  7.3  5.6   RBC  4.42*  4.41*   HEMOGLOBIN  14.1  14.2   HEMATOCRIT  40.3*  40.3*   MCV  91.2  91.4   MCH  31.9  32.2   MCHC  35.0  35.2   RDW  41.1  41.5   PLATELETCT  166  162*   MPV  11.1  11.3     Recent Labs      10/03/18   0202  10/04/18   0437   SODIUM  137  136   POTASSIUM  3.7  4.0   CHLORIDE  106  102   CO2  25  26   GLUCOSE  121*  147*   BUN  7*  9   CREATININE  0.76  1.04   CALCIUM  8.9  8.6                      Assessment/Plan     * Transaminitis   Assessment & Plan    With common bile duct dilatation, but MRCP did not show significant distal stone.    Started trending down and improving   Status post lap rosa maria.          Cholelithiasis- (present on admission)   Assessment & Plan    Known disease, with symptoms.    S/p lap rosa maria POD #1.  Advance diet as tolerated recommended by surgery.  Appreciate surgery help  No signs of sepsis or infection, DC antibiotics        Overweight- (present on admission)   Assessment & Plan    Body mass index is 28.94 kg/m².  With reduction education provided     Liver mass:  -Unclear etiology, AFP pending  -MRI with liver protocol pending.  May need biopsy if needed     Quality-Core Measures   Reviewed items::  Labs reviewed, Medications reviewed and Radiology images reviewed  Almonte catheter::  No Almonte  DVT prophylaxis pharmacological::  Enoxaparin (Lovenox)  DVT prophylaxis - mechanical:  SCDs  Ulcer Prophylaxis::  Not indicated   For complexity-based billing, please refer to the history, exam, and decison making above. In addition, I spent >35 minutes caring for the patient today. More than 50% of the time was spent counseling and coordinating care.    I have discussed with RN and CM and SW and other consultants about patient's plan.

## 2018-10-04 NOTE — PROGRESS NOTES
Pt accepted from PACU, a&ox4, vss, tolerating diet, voiding qs, x4  Lap sites to abd. Call light within reach webnt over POC.

## 2018-10-04 NOTE — OR NURSING
Pt to recovery, sleeping, resps unlabored. Airway dc'd shortly after arrival. Medicated per MAR for reported discomfort in abdominal surgical site. Denies nausea, tolerating oral analgesics and sips of clears. Now states pain is tolerable. Lap sites x4 with transparent and gauze dressing in place. Only scant blood under transparent films. Cold pack applied to site. Pt's wife updated via  and pt able to speak with her directly. VSS on 2L FiO2.
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
## 2018-10-04 NOTE — CARE PLAN
Problem: Respiratory:  Goal: Respiratory status will improve    Intervention: Educate and encourage coughing and deep breathing  Encouraging cough deep breathe, patient performed correctly, patient ambulated postoperatively, tolerated well, continuous pulse ox in place.       Problem: Skin Integrity  Goal: Risk for impaired skin integrity will decrease    Intervention: Assess and monitor skin integrity, appearance and/or temperature  Dressings to lap rosa maria sites are intact, scant serosanguineous drainage noted, no other skin breakdown noted.

## 2018-10-05 ENCOUNTER — APPOINTMENT (OUTPATIENT)
Dept: RADIOLOGY | Facility: MEDICAL CENTER | Age: 53
DRG: 419 | End: 2018-10-05
Attending: INTERNAL MEDICINE
Payer: COMMERCIAL

## 2018-10-05 ENCOUNTER — PATIENT OUTREACH (OUTPATIENT)
Dept: HEALTH INFORMATION MANAGEMENT | Facility: OTHER | Age: 53
End: 2018-10-05

## 2018-10-05 VITALS
HEIGHT: 67 IN | SYSTOLIC BLOOD PRESSURE: 119 MMHG | RESPIRATION RATE: 18 BRPM | OXYGEN SATURATION: 97 % | TEMPERATURE: 98.2 F | HEART RATE: 68 BPM | BODY MASS INDEX: 28.06 KG/M2 | DIASTOLIC BLOOD PRESSURE: 64 MMHG | WEIGHT: 178.79 LBS

## 2018-10-05 PROBLEM — D18.03 LIVER HEMANGIOMA: Status: ACTIVE | Noted: 2018-10-04

## 2018-10-05 LAB
AFP-TM SERPL-MCNC: 2 NG/ML (ref 0–9)
ALBUMIN SERPL BCP-MCNC: 3.5 G/DL (ref 3.2–4.9)
ALBUMIN/GLOB SERPL: 1.2 G/DL
ALP SERPL-CCNC: 85 U/L (ref 30–99)
ALT SERPL-CCNC: 128 U/L (ref 2–50)
ANION GAP SERPL CALC-SCNC: 7 MMOL/L (ref 0–11.9)
AST SERPL-CCNC: 55 U/L (ref 12–45)
BILIRUB SERPL-MCNC: 0.8 MG/DL (ref 0.1–1.5)
BUN SERPL-MCNC: 11 MG/DL (ref 8–22)
CALCIUM SERPL-MCNC: 8.5 MG/DL (ref 8.5–10.5)
CHLORIDE SERPL-SCNC: 105 MMOL/L (ref 96–112)
CO2 SERPL-SCNC: 26 MMOL/L (ref 20–33)
CREAT SERPL-MCNC: 0.91 MG/DL (ref 0.5–1.4)
GLOBULIN SER CALC-MCNC: 3 G/DL (ref 1.9–3.5)
GLUCOSE SERPL-MCNC: 119 MG/DL (ref 65–99)
POTASSIUM SERPL-SCNC: 3.6 MMOL/L (ref 3.6–5.5)
PROT SERPL-MCNC: 6.5 G/DL (ref 6–8.2)
SODIUM SERPL-SCNC: 138 MMOL/L (ref 135–145)

## 2018-10-05 PROCEDURE — A9585 GADOBUTROL INJECTION: HCPCS | Performed by: INTERNAL MEDICINE

## 2018-10-05 PROCEDURE — 700102 HCHG RX REV CODE 250 W/ 637 OVERRIDE(OP): Performed by: HOSPITALIST

## 2018-10-05 PROCEDURE — 99239 HOSP IP/OBS DSCHRG MGMT >30: CPT | Performed by: INTERNAL MEDICINE

## 2018-10-05 PROCEDURE — 74183 MRI ABD W/O CNTR FLWD CNTR: CPT

## 2018-10-05 PROCEDURE — 700117 HCHG RX CONTRAST REV CODE 255: Performed by: INTERNAL MEDICINE

## 2018-10-05 PROCEDURE — 36415 COLL VENOUS BLD VENIPUNCTURE: CPT

## 2018-10-05 PROCEDURE — 80053 COMPREHEN METABOLIC PANEL: CPT

## 2018-10-05 PROCEDURE — 700111 HCHG RX REV CODE 636 W/ 250 OVERRIDE (IP): Performed by: HOSPITALIST

## 2018-10-05 PROCEDURE — A9270 NON-COVERED ITEM OR SERVICE: HCPCS | Performed by: HOSPITALIST

## 2018-10-05 RX ORDER — GADOBUTROL 604.72 MG/ML
7.5 INJECTION INTRAVENOUS ONCE
Status: COMPLETED | OUTPATIENT
Start: 2018-10-05 | End: 2018-10-05

## 2018-10-05 RX ADMIN — ACETAMINOPHEN 650 MG: 325 TABLET, FILM COATED ORAL at 04:11

## 2018-10-05 RX ADMIN — ONDANSETRON HYDROCHLORIDE 4 MG: 2 INJECTION INTRAMUSCULAR; INTRAVENOUS at 04:11

## 2018-10-05 RX ADMIN — SENNOSIDES AND DOCUSATE SODIUM 2 TABLET: 8.6; 5 TABLET ORAL at 04:57

## 2018-10-05 RX ADMIN — GADOBUTROL 7.5 ML: 604.72 INJECTION INTRAVENOUS at 14:19

## 2018-10-05 ASSESSMENT — PATIENT HEALTH QUESTIONNAIRE - PHQ9
SUM OF ALL RESPONSES TO PHQ9 QUESTIONS 1 AND 2: 0
2. FEELING DOWN, DEPRESSED, IRRITABLE, OR HOPELESS: NOT AT ALL
1. LITTLE INTEREST OR PLEASURE IN DOING THINGS: NOT AT ALL

## 2018-10-05 ASSESSMENT — PAIN SCALES - GENERAL
PAINLEVEL_OUTOF10: 6
PAINLEVEL_OUTOF10: 0

## 2018-10-05 NOTE — DISCHARGE SUMMARY
Discharge Summary    CHIEF COMPLAINT ON ADMISSION  Chief Complaint   Patient presents with   • Epigastric Pain     Pt reporting constant epigastric pain that started yesterday approx noon.   • Nausea       Reason for Admission  abdominal pain    Admission Date  10/3/2018    CODE STATUS  Full Code    HPI & HOSPITAL COURSE  53 y.o. male admitted 10/3/2018 with abdominal pain and concern of choledocholithiasis.  Patient's MRCP did not show significant signs of CBD stone.  Patient was seen by surgeon and lap rosa maria was performed 5/3.  Patient tolerated procedure very well.  Incidentally patient was noticed to have liver mass, MRI to further characterize liver mass showing hemangioma.  Patient currently stable and ready to be discharged home.  Patient tolerated regular diet at this moment..       Therefore, he is discharged in good and stable condition to home with close outpatient follow-up.    The patient met 2-midnight criteria for an inpatient stay at the time of discharge.    Discharge Date  10/5/2018    FOLLOW UP ITEMS POST DISCHARGE  none    DISCHARGE DIAGNOSES      Transaminitis POA: Yes    Overweight POA: Yes    Cholelithiasis POA: Yes    Liver hemangioma POA: Yes      FOLLOW UP  No future appointments.  Allison Bragg M.D.  [address] [id number] [phone]    In 2 weeks      [address] [address] [phone]  Schedule an appointment as soon as possible for a visit  To establish with a primary care provider. This office offers discounts to patients who do not have insurance.      MEDICATIONS ON DISCHARGE     Medication List      You have not been prescribed any medications.         Allergies  No Known Allergies    DIET  Orders Placed This Encounter   Procedures   • Diet Order Regular     Standing Status:   Standing     Number of Occurrences:   1     Order Specific Question:   Diet:     Answer:   Regular [1]     Order Specific  Question:   Macronutrient modifications:     Answer:   Low Fat [5]       ACTIVITY  As tolerated.  Weight bearing as tolerated    CONSULTATIONS  surgery    PROCEDURES  Lap rosa maria    MR-ABDOMEN-WITH & W/O   Final Result         1. Hepatic lesion of interest represents a benign, flash filling hemangioma.   2. Interval cholecystectomy with resolution of extrahepatic and intrahepatic biliary dilatation. No CBD stones.   3. Trace pelvic ascites, related to recent surgery.         [id number]   Final Result      Extrahepatic biliary ductal dilatation with mild intrahepatic biliary ductal prominence. Very distal choledocholithiasis is not excluded.      Multiple gallstones are seen within the gallbladder. There may be trace pericholecystic fluid. Gallbladder wall does not appear significantly thickened.      9 x 10 mm T2 hyperintense lesion within the posterior right lobe of the liver could represent a hemangioma. Further evaluation could be obtained with hepatic protocol MRI for definitive characterization.         OUTSIDE IMAGES-US ABDOMEN   Final Result          PE:  Gen: AAOx3, NAD  Eyes: PELLA  Neck: no JVD, no lymphadenopathy  Cardia: RRR, no mrg  Lungs: CTAB, no rales, rhonci or wheezing  Abd: NABS, soft, non extended, no mass  EXT: No C/C/E, peripheral pulse 2+ b/l  Neuro: CN II-XII intact, non focal, reflex 2+ symmetrical  Skin: Intact, no lesion, warm  Psych: Appropriate.      LABORATORY  Lab Results   Component Value Date    SODIUM 138 10/05/2018    POTASSIUM 3.6 10/05/2018    CHLORIDE 105 10/05/2018    CO2 26 10/05/2018    GLUCOSE 119 (H) 10/05/2018    BUN 11 10/05/2018    CREATININE 0.91 10/05/2018        Lab Results   Component Value Date    WBC 5.6 10/04/2018    HEMOGLOBIN 14.2 10/04/2018    HEMATOCRIT 40.3 (L) 10/04/2018    PLATELETCT 162 (L) 10/04/2018        Total time of the discharge process exceeds 37 minutes.

## 2018-10-06 NOTE — DISCHARGE INSTRUCTIONS
Discharge Instructions    Discharged to home by car with friend. Discharged via walking, hospital escort: Refused.  Special equipment needed: Not Applicable    Be sure to schedule a follow-up appointment with your primary care doctor or any specialists as instructed.     Discharge Plan:   Influenza Vaccine Indication: Patient Refuses    I understand that a diet low in cholesterol, fat, and sodium is recommended for good health. Unless I have been given specific instructions below for another diet, I accept this instruction as my diet prescription.   Other diet: n/a    Special Instructions: None    · Is patient discharged on Warfarin / Coumadin?   No     Depression / Suicide Risk    As you are discharged from this [facility] facility, it is important to learn how to keep safe from harming yourself.    Recognize the warning signs:  · Abrupt changes in personality, positive or negative- including increase in energy   · Giving away possessions  · Change in eating patterns- significant weight changes-  positive or negative  · Change in sleeping patterns- unable to sleep or sleeping all the time   · Unwillingness or inability to communicate  · Depression  · Unusual sadness, discouragement and loneliness  · Talk of wanting to die  · Neglect of personal appearance   · Rebelliousness- reckless behavior  · Withdrawal from people/activities they love  · Confusion- inability to concentrate     If you or a loved one observes any of these behaviors or has concerns about self-harm, here's what you can do:  · Talk about it- your feelings and reasons for harming yourself  · Remove any means that you might use to hurt yourself (examples: pills, rope, extension cords, firearm)  · Get professional help from the community (Mental Health, Substance Abuse, psychological counseling)  · Do not be alone:Call your Safe Contact- someone whom you trust who will be there for you.  · Call your local CRISIS HOTLINE 006-8487 or  808-680-8319  · Call your local Children's Mobile Crisis Response Team [place] (502) 204-5746 or www.[employer]  · Call the toll free National Suicide Prevention Hotlines   · National Suicide Prevention Lifeline 342-425-EWPE (7204)  · National [employer] Line Network 800-SUICIDE (704-9990)

## 2018-10-06 NOTE — PROGRESS NOTES
Pt d/c'd home via personal vehicle with friend. D/c instructions // scripts discussed and questions answered. PIV d/c'd and hemostasis achieved.

## 2018-12-12 NOTE — OP REPORT
DATE OF OPERATION: October 3, 2018    PREOPERATIVE DIAGNOSIS: Cholelithiasis with recent episode of likely choledocholithiasis with elevated liver function tests  POSTOPERATIVE DIAGNOSIS: Acute cholecystitis with cholelithiasis with recent episode of likely choledocholithiasis with elevated liver function tests      PROCEDURE: Laparoscopic cholecystectomy    SURGEON: Allison Philip MD   ASSISTANT:  Heidy Grace CFA    ANESTHESIOLOGIST: Dr. Gansert with [facility]    SPECIMENS: Gallbladder and contents.     FINDINGS: Chronic appearing adhesions of the omentum to the gallbladder wall, edema in the posterior wall of the gallbladder, multiple large gallstones    INDICATIONS: This is a 53 year old man transferred from [facility] for possible choledocholithiasis. The MRCP was essentially negative for a retained stone, but did show dilation of the common bile duct.   We discussed definitive surgical therapy with risks including, but   not limited to bleeding, infection, damage to the common duct, possibly   requiring hepaticojejunostomy. A  was used to obtain consent. Patient understood and agreed to proceed.     DESCRIPTION OF PROCEDURE: The patient was brought to the operating room. He  was placed in a comfortable supine position on the operating room table with   all appropriate points padded. General anesthesia was induced without   complication. A time-out was held and completed confirming correct patient,   correct site, correct procedure and SCDs on and functioning. He received   Ancef prior to incision. After prepping the abdomen with ChloraPrep and   draping in usual sterile fashion, 0.25% Marcaine with epinephrine was   infiltrated supraumbilically and a 1-cm incision was made in this area. This   was taken down the fascia bluntly using a hemostat and a penetrating towel   clip was used to grasp the umbilical stalk and elevate the abdominal wall. A   Veress needle was placed into the  abdomen and a saline drop test showed no   evidence of injury to bowel or vessel. I then insufflated the abdomen to a   pressure of 15 mmHg with CO2. An 11-mm trocar was placed through this   incision into the abdomen and a camera was then inserted confirming no   evidence of injury to bowel or vessel.  I then placed an 11-mm   trocar in the epigastrium and two 5-mm trocars in the right upper quadrant   under direct visualization after appropriate numbing of the skin. My assistant grasped   the fundus of the gallbladder and lifted it up over the liver edge. I removed the omental adhesions with a combination of blunt and bovie electrcautery. I then grasped the   infundibulum of the gallbladder and   retracted it laterally and circumferentially dissected the cystic duct and the   cystic artery from lateral to medial using a [place] grasper. He had an unusual gallbladder anatomy as I noted on the scan, with a patulous infundibulum and short cystic duct. I obtained the critical view of safety.  I then placed   three 10-mm hemolock clips on the patient's side and one on the specimen side on the   cystic duct and divided sharply. I then placed 2 clips on the cystic artery   on the patient's side and one on the specimen side and divided sharply and   removed the gallbladder from the liver bed using Bovie electrocautery. A small amount of bile and tiny stones came out of the gallbladder at the clipped cystic duct.   The gallbladder was then placed in an EndoCatch   bag and removed through the epigastric port incision. I then obtained   excellent hemostasis and copiously irrigated the area with 2 liters of warm   saline until entirely clear. I did place some surgifoam in the gallbladder fossa. I closed the fascia on the epigastric port using   a  interrupted 0 Vicryl with an Endoclose under direct visualization   and then removed the two 5-mm ports confirming good hemostasis. I closed the   fascia on the umbilical port  using a single interrupted 0 Vicryl suture and   irrigated all 4 incisions. I then closed the skin using a single running 4-0   Vicryl in a subcuticular fashion. These were then dressed with dry dressings   and patient was awoken from anesthesia in good condition having tolerated the   procedure well. All counts were correct at the end of the case x2.      No

## (undated) DEVICE — DRAPESURG STERI-DRAPE LONG - (10/BX 4BX/CA)

## (undated) DEVICE — LACTATED RINGERS INJ 1000 ML - (14EA/CA 60CA/PF)

## (undated) DEVICE — HEAD HOLDER JUNIOR/ADULT

## (undated) DEVICE — PACK LAP CHOLE OR - (2EA/CA)

## (undated) DEVICE — GLOVE BIOGEL SZ 8 SURGICAL PF LTX - (50PR/BX 4BX/CA)

## (undated) DEVICE — SENSOR SPO2 NEO LNCS ADHESIVE (20/BX) SEE USER NOTES

## (undated) DEVICE — MASK ANESTHESIA ADULT  - (100/CA)

## (undated) DEVICE — SODIUM CHL IRRIGATION 0.9% 1000ML (12EA/CA)

## (undated) DEVICE — TUBE CONNECT SUCTION CLEAR 120 X 1/4" (50EA/CA)"

## (undated) DEVICE — SCISSORS 5MM CVD (6EA/BX)

## (undated) DEVICE — SUTURE 0 VICRYL PLUS UR-6 - 27 INCH (36/BX)

## (undated) DEVICE — CANISTER SUCTION 3000ML MECHANICAL FILTER AUTO SHUTOFF MEDI-VAC NONSTERILE LF DISP  (40EA/CA)

## (undated) DEVICE — TROCAR Z THREAD11MM OPTICAL - NON BLADED(6/BX)

## (undated) DEVICE — GLOVE SZ 7 BIOGEL PI MICRO - PF LF (50PR/BX 4BX/CA)

## (undated) DEVICE — GLOVE BIOGEL ECLIPSE  PF LATEX SIZE 6.5 (50PR/BX)

## (undated) DEVICE — SUTURE 0 COATED VICRYL 6-18IN - (12PK/BX)

## (undated) DEVICE — GLOVE BIOGEL SZ 6.5 SURGICAL PF LTX (50PR/BX 4BX/CA)

## (undated) DEVICE — DETERGENT RENUZYME PLUS 10 OZ PACKET (50/BX)

## (undated) DEVICE — CANNULA W/SEAL11X100ZTHREAD - (12/BX)

## (undated) DEVICE — TUBING INSUFFLATION - (10/BX)

## (undated) DEVICE — NEPTUNE 4 PORT MANIFOLD - (20/PK)

## (undated) DEVICE — CLIP MED LG INTNL HRZN TI ESCP - (20/BX)

## (undated) DEVICE — KIT ROOM DECONTAMINATION

## (undated) DEVICE — CLIP HEMOLOCK PURPLE - (14/BX)

## (undated) DEVICE — KIT SURGIFLO W/OUT THROMBIN - (6EA/CA)

## (undated) DEVICE — SET LEADWIRE 5 LEAD BEDSIDE DISPOSABLE ECG (1SET OF 5/EA)

## (undated) DEVICE — APPLICATOR SURGIFLO - (6EA/CA)

## (undated) DEVICE — TUBE E-T HI-LO CUFF 8.0MM (10EA/PK)

## (undated) DEVICE — SET SUCTION/IRRIGATION WITH DISPOSABLE TIP (6/CA )PART #0250-070-520 IS A SUB

## (undated) DEVICE — PROTECTOR ULNA NERVE - (36PR/CA)

## (undated) DEVICE — ELECTRODE DUAL RETURN W/ CORD - (50/PK)

## (undated) DEVICE — GLOVE BIOGEL INDICATOR SZ 7SURGICAL PF LTX - (50/BX 4BX/CA)

## (undated) DEVICE — KIT ANESTHESIA W/CIRCUIT & 3/LT BAG W/FILTER (20EA/CA)

## (undated) DEVICE — CHLORAPREP 26 ML APPLICATOR - ORANGE TINT(25/CA)

## (undated) DEVICE — SET EXTENSION WITH 2 PORTS (48EA/CA) ***PART #2C8610 IS A SUBSTITUTE*****

## (undated) DEVICE — CANNULA W/SEAL 5X100 Z-THRE - ADED KII (12/BX)

## (undated) DEVICE — SPONGE GAUZESTER. 2X2 4-PL - (2/PK 50PK/BX 30BX/CS)

## (undated) DEVICE — SUTURE 4-0 VICRYL PLUS FS-2 - 27 INCH (36/BX)

## (undated) DEVICE — DRESSING TRANSPARENT FILM TEGADERM 2.375 X 2.75"  (100EA/BX)"

## (undated) DEVICE — NEEDLE INSFL 120MM 14GA VRRS - (20/BX)

## (undated) DEVICE — SUCTION INSTRUMENT YANKAUER BULBOUS TIP W/O VENT (50EA/CA)

## (undated) DEVICE — TUBING CLEARLINK DUO-VENT - C-FLO (48EA/CA)

## (undated) DEVICE — BAG RETRIEVAL 10ML (10EA/BX)

## (undated) DEVICE — SUTURE GENERAL

## (undated) DEVICE — ELECTRODE 850 FOAM ADHESIVE - HYDROGEL RADIOTRNSPRNT (50/PK)

## (undated) DEVICE — TROCAR 5X100 NON BLADED Z-TH - READ KII (6/BX)